# Patient Record
Sex: FEMALE | Race: WHITE | HISPANIC OR LATINO | Employment: FULL TIME | URBAN - METROPOLITAN AREA
[De-identification: names, ages, dates, MRNs, and addresses within clinical notes are randomized per-mention and may not be internally consistent; named-entity substitution may affect disease eponyms.]

---

## 2018-12-10 ENCOUNTER — APPOINTMENT (OUTPATIENT)
Dept: URGENT CARE | Age: 33
End: 2018-12-10
Payer: OTHER MISCELLANEOUS

## 2018-12-10 PROCEDURE — G0382 LEV 3 HOSP TYPE B ED VISIT: HCPCS | Performed by: PREVENTIVE MEDICINE

## 2018-12-10 PROCEDURE — 99283 EMERGENCY DEPT VISIT LOW MDM: CPT | Performed by: PREVENTIVE MEDICINE

## 2018-12-11 ENCOUNTER — TRANSCRIBE ORDERS (OUTPATIENT)
Dept: PHYSICAL THERAPY | Age: 33
End: 2018-12-11

## 2018-12-11 ENCOUNTER — EVALUATION (OUTPATIENT)
Dept: PHYSICAL THERAPY | Age: 33
End: 2018-12-11
Payer: OTHER MISCELLANEOUS

## 2018-12-11 DIAGNOSIS — M54.2 CERVICALGIA: Primary | ICD-10-CM

## 2018-12-11 DIAGNOSIS — M54.5 ACUTE LEFT-SIDED LOW BACK PAIN, WITH SCIATICA PRESENCE UNSPECIFIED: ICD-10-CM

## 2018-12-11 DIAGNOSIS — M54.2 NECK PAIN: Primary | ICD-10-CM

## 2018-12-11 PROCEDURE — G8990 OTHER PT/OT CURRENT STATUS: HCPCS | Performed by: PHYSICAL THERAPIST

## 2018-12-11 PROCEDURE — 97140 MANUAL THERAPY 1/> REGIONS: CPT | Performed by: PHYSICAL THERAPIST

## 2018-12-11 PROCEDURE — G8991 OTHER PT/OT GOAL STATUS: HCPCS | Performed by: PHYSICAL THERAPIST

## 2018-12-11 PROCEDURE — 97162 PT EVAL MOD COMPLEX 30 MIN: CPT | Performed by: PHYSICAL THERAPIST

## 2018-12-11 NOTE — PROGRESS NOTES
PT Evaluation     Today's date: 2018  Patient name: Deena Huston  : 1985  MRN: [id number]  Referring provider: Hussein Evans MD  Dx:   Encounter Diagnosis     ICD-10-CM    1  Neck pain M54 2    2  Acute left-sided low back pain, with sciatica presence unspecified M54 5                   Assessment  Assessment details: 26year old female patients reports to PT with acute left sided low back and right sided neck pain that occurred while at work on 18 when patient tried to lift a heavy box off the floor  Patient symptoms were highly irritable to generalized movement, so testing was limited  Patient was TTP in the L paraspinal and quadratus lumborum region in the low back and in the R upper trap in the cervical region, with TTP noted during mobility assessment of the lower cervical spine  Patient's primary impairments for both is mobility impairment, so focus will initially be on increasing ROM  Patient will benefit from skilled PT services to address current impairments and functional limitations to help patient return to her PLOF  Impairments: abnormal or restricted ROM, abnormal movement, activity intolerance, impaired physical strength and pain with function    Symptom irritability: highUnderstanding of Dx/Px/POC: good   Prognosis: good    Goals  STG  1  Patient will be independent with completion of HEP throughout therapy  2  Patient will have at worst 8/10 pain so patient can sleep with less discomfort in 3 weeks  LTG  1  Patient will increase cervical ROM to WNL so patient can drive without difficulty in 6 weeks  2  Patient will increase lumbar ROM to WNL so patient can lift objects off the floor with proper body mechanics in 6 weeks  3  Patient will return to work without increased difficulty in 6 weeks      Plan  Patient would benefit from: skilled physical therapy  Planned therapy interventions: abdominal trunk stabilization, joint mobilization, manual therapy, neuromuscular re-education, patient education, strengthening, stretching, therapeutic activities, therapeutic exercise, home exercise program, functional ROM exercises, flexibility and body mechanics training  Frequency: 2x week  Duration in weeks: 6  Treatment plan discussed with: patient        Subjective Evaluation    History of Present Illness  Mechanism of injury: Patient reports with acute left low back and right neck pain that happened at work 18 when patient went to  a really heavy box  Patient denies numbness/tingling, progressive weakness, bowel/bladder issues, saddle anesthesia, and any dexterity or balance deficits  Patient symptoms are highly irritable to general movement, and patient has difficulty sleeping due to her pain  Patient is currently not working  Pain  Current pain ratin  At best pain ratin  At worst pain rating: 10  Aggravating factors: walking, standing, sitting, overhead activity and lifting    Treatments  Current treatment: physical therapy  Patient Goals  Patient goals for therapy: decreased pain, increased motion and return to work          Objective     Special Questions  Positive for disturbed sleep   Negative for night pain, bladder dysfunction, bowel dysfunction and saddle (S4) numbness    Palpation     Additional Palpation Details  R upper trap TTP  TTP to L paraspinals and QL region, with QL being > than paraspinals    Tenderness     Additional Tenderness Details  TTP throughout cervical mobility assessment, with lower cervical being more irritable    Neurological Testing     Sensation     Lumbar   Left   Intact: light touch    Right   Intact: light touch    Active Range of Motion   Cervical/Thoracic Spine   Cervical    Left rotation: 45 degrees   Right rotation: 60 degrees     Lumbar   Flexion: 30 degrees with pain  Extension: 5 degrees with pain    Strength/Myotome Testing     Left Ankle/Foot   Dorsiflexion: 4  Great toe extension: 4    Right Ankle/Foot   Dorsiflexion: 4  Great toe extension: 4      Flowsheet Rows      Most Recent Value   PT/OT G-Codes   Current Score  35   Projected Score  59   FOTO information reviewed  Yes   Assessment Type  Evaluation   G code set  Other PT/OT Primary   Other PT Primary Current Status ()  CL   Other PT Primary Goal Status ()  CK          Precautions: none    Daily Treatment Diary     Manual  12/11            R cervical manuals STM, Gr I lower cervical mobs                                                                    Exercise Diary  12/11            [address] If can tolerate            Seated lumbar flexion If can tolerate                                                                                                                                                                                                                                             Modalities  12/11            MH 10 min cervical/lowback seated

## 2018-12-11 NOTE — LETTER
2018    Nadia Romeo MD  [id number]    Patient: Adi Michelle   YOB: 1985   Date of Visit: 2018     Encounter Diagnosis     ICD-10-CM    1  Neck pain M54 2    2  Acute left-sided low back pain, with sciatica presence unspecified M54 5        Dear Dr Nilda Sanchez:    Please review the attached Plan of Care from [facility]'s recent visit  Please verify that you agree therapy should continue by signing the attached document and sending it back to our office  If you have any questions or concerns, please don't hesitate to call  Sincerely,    Krishna Mcarthur, PT      Referring Provider:      I certify that I have read the below Plan of Care and certify the need for these services furnished under this plan of treatment while under my care  Nadia Romeo MD  [address] [address]  VIA Facsimile: [phone]          PT Evaluation     Today's date: 2018  Patient name: Adi Michelle  : 1985  MRN: [id number]  Referring provider: Breezy Hamilton MD  Dx:   Encounter Diagnosis     ICD-10-CM    1  Neck pain M54 2    2  Acute left-sided low back pain, with sciatica presence unspecified M54 5                   Assessment  Assessment details: 26year old female patients reports to PT with acute left sided low back and right sided neck pain that occurred while at work on 18 when patient tried to lift a heavy box off the floor  Patient symptoms were highly irritable to generalized movement, so testing was limited  Patient was TTP in the L paraspinal and quadratus lumborum region in the low back and in the R upper trap in the cervical region, with TTP noted during mobility assessment of the lower cervical spine  Patient's primary impairments for both is mobility impairment, so focus will initially be on increasing ROM    Patient will benefit from skilled PT services to address current impairments and functional limitations to help patient return to her PLOF  Impairments: abnormal or restricted ROM, abnormal movement, activity intolerance, impaired physical strength and pain with function    Symptom irritability: highUnderstanding of Dx/Px/POC: good   Prognosis: good    Goals  STG  1  Patient will be independent with completion of HEP throughout therapy  2  Patient will have at worst 8/10 pain so patient can sleep with less discomfort in 3 weeks  LTG  1  Patient will increase cervical ROM to WNL so patient can drive without difficulty in 6 weeks  2  Patient will increase lumbar ROM to WNL so patient can lift objects off the floor with proper body mechanics in 6 weeks  3  Patient will return to work without increased difficulty in 6 weeks  Plan  Patient would benefit from: skilled physical therapy  Planned therapy interventions: abdominal trunk stabilization, joint mobilization, manual therapy, neuromuscular re-education, patient education, strengthening, stretching, therapeutic activities, therapeutic exercise, home exercise program, functional ROM exercises, flexibility and body mechanics training  Frequency: 2x week  Duration in weeks: 6  Treatment plan discussed with: patient        Subjective Evaluation    History of Present Illness  Mechanism of injury: Patient reports with acute left low back and right neck pain that happened at work 18 when patient went to  a really heavy box  Patient denies numbness/tingling, progressive weakness, bowel/bladder issues, saddle anesthesia, and any dexterity or balance deficits  Patient symptoms are highly irritable to general movement, and patient has difficulty sleeping due to her pain  Patient is currently not working      Pain  Current pain ratin  At best pain ratin  At worst pain rating: 10  Aggravating factors: walking, standing, sitting, overhead activity and lifting    Treatments  Current treatment: physical therapy  Patient Goals  Patient goals for therapy: decreased pain, increased motion and return to work          Objective     Special Questions  Positive for disturbed sleep   Negative for night pain, bladder dysfunction, bowel dysfunction and saddle (S4) numbness    Palpation     Additional Palpation Details  R upper trap TTP  TTP to L paraspinals and QL region, with QL being > than paraspinals    Tenderness     Additional Tenderness Details  TTP throughout cervical mobility assessment, with lower cervical being more irritable    Neurological Testing     Sensation     Lumbar   Left   Intact: light touch    Right   Intact: light touch    Active Range of Motion   Cervical/Thoracic Spine   Cervical    Left rotation: 45 degrees   Right rotation: 60 degrees     Lumbar   Flexion: 30 degrees with pain  Extension: 5 degrees with pain    Strength/Myotome Testing     Left Ankle/Foot   Dorsiflexion: 4  Great toe extension: 4    Right Ankle/Foot   Dorsiflexion: 4  Great toe extension: 4      Flowsheet Rows      Most Recent Value   PT/OT G-Codes   Current Score  35   Projected Score  59   FOTO information reviewed  Yes   Assessment Type  Evaluation   G code set  Other PT/OT Primary   Other PT Primary Current Status ()  CL   Other PT Primary Goal Status ()  CK          Precautions: none    Daily Treatment Diary     Manual  12/11            R cervical manuals STM, Gr I lower cervical mobs                                                                    Exercise Diary  12/11            [facility]             DKTC If can tolerate            Seated lumbar flexion If can tolerate                                                                                                                                                                                                                                             Modalities  12/11             10 min cervical/lowback seated

## 2018-12-14 ENCOUNTER — OFFICE VISIT (OUTPATIENT)
Dept: PHYSICAL THERAPY | Age: 33
End: 2018-12-14
Payer: OTHER MISCELLANEOUS

## 2018-12-14 DIAGNOSIS — M25.552 LEFT HIP PAIN: ICD-10-CM

## 2018-12-14 DIAGNOSIS — M54.5 ACUTE LEFT-SIDED LOW BACK PAIN, WITH SCIATICA PRESENCE UNSPECIFIED: ICD-10-CM

## 2018-12-14 DIAGNOSIS — M54.2 NECK PAIN: Primary | ICD-10-CM

## 2018-12-14 PROCEDURE — 97110 THERAPEUTIC EXERCISES: CPT | Performed by: PHYSICAL THERAPIST

## 2018-12-14 NOTE — PROGRESS NOTES
Daily Note     Today's date: 2018  Patient name: Maci Lemus  : 1985  MRN: [id number]  Referring provider: Vickey Max MD  Dx:   Encounter Diagnosis     ICD-10-CM    1  Neck pain M54 2    2  Acute left-sided low back pain, with sciatica presence unspecified M54 5                   Subjective: Patient notes neck is still stiff very stiff, and she is having more pain and numbness down her leg, especially when she puts weight on it and she sits  Objective: See treatment diary below    Precautions: none     Daily Treatment Diary      Manual                     R cervical manuals STM, Gr I lower cervical mobs                                                                                                                           Exercise Diary                     SKTC                       DKTC If can tolerate                     Seated lumbar flexion If can tolerate                     R sidelying with pillows under hips   3 pillows 5 min, 4 pillows 15 minutes                                                                                                                                                                                                                                                                                                                                                                                                                         Modalities                     MH 10 min cervical/lowback seated  10 min low back seated                                                                         Assessment: Tolerated treatment fair  Patient presented with R lateral shift today, as patient was unable to put weight through her L LE, and when she was sitting, she had pain down her L LE and numbness    Lateral shift correction was initiated in sidelying position as to not be too aggressive as patient's symptoms are still highly irritable  Patient was able to tolerate lying on her R side with four pillows for prolonged periods, and post, had no L LE symptoms when in seated position, and had decrease in pain rating by 2 points  Patient cervical region is still highly irritable, so patient educated to continue to try and move it as much as she can  Retractions were trialed this treatment, but patient was unable to tolerate  Plan: Progress treatment as tolerated  If patient still remains highly irritable with minimal gains from PT, consider referral to pain management for further testing

## 2018-12-17 ENCOUNTER — APPOINTMENT (OUTPATIENT)
Dept: URGENT CARE | Age: 33
End: 2018-12-17
Payer: OTHER MISCELLANEOUS

## 2018-12-17 ENCOUNTER — OFFICE VISIT (OUTPATIENT)
Dept: PHYSICAL THERAPY | Age: 33
End: 2018-12-17
Payer: OTHER MISCELLANEOUS

## 2018-12-17 DIAGNOSIS — M54.5 ACUTE LEFT-SIDED LOW BACK PAIN, WITH SCIATICA PRESENCE UNSPECIFIED: ICD-10-CM

## 2018-12-17 DIAGNOSIS — M54.2 NECK PAIN: Primary | ICD-10-CM

## 2018-12-17 PROCEDURE — 97110 THERAPEUTIC EXERCISES: CPT | Performed by: PHYSICAL THERAPIST

## 2018-12-17 PROCEDURE — 99214 OFFICE O/P EST MOD 30 MIN: CPT | Performed by: PREVENTIVE MEDICINE

## 2018-12-17 NOTE — PROGRESS NOTES
Daily Note     Today's date: 2018  Patient name: Jose Ramon Smith  : 1985  MRN: [id number]  Referring provider: Evonne Barragan MD  Dx:   Encounter Diagnosis     ICD-10-CM    1  Neck pain M54 2    2  Acute left-sided low back pain, with sciatica presence unspecified M54 5                   Subjective: Patient notes was in 7/10 pain earlier today, but now symptoms are increased from her MD examining her  Objective: See treatment diary below    Precautions: none     Daily Treatment Diary      Manual                   R cervical manuals STM, Gr I lower cervical mobs                                                                                                                           Exercise Diary                   SKTC                       DKTC If can tolerate                     Seated lumbar flexion If can tolerate                     R sidelying with pillows under hips   3 pillows 5 min, 4 pillows 15 minutes  3 pillows 10 min, 4 pillows 5 min                                                                                                                                                                                                                                                                                                                                                                                                                       Modalities                   MH 10 min cervical/lowback seated  10 min low back seated  10 min low back seated                                                                       Assessment: Tolerated treatment fair  Patient hasn't had L LE symptoms since began correcting lateral shift  Patient was able to tolerate 2 pillows lying on her side for a few minutes until symptoms in low back flared up    Continue to trial prolonged positions to help address lateral shift and posterior derangement, as patient did not some decrease in pain this morning  Plan: Progress treatment as tolerated  If patient still remains highly irritable with minimal gains from PT, consider referral to pain management for further testing

## 2018-12-20 ENCOUNTER — APPOINTMENT (OUTPATIENT)
Dept: PHYSICAL THERAPY | Age: 33
End: 2018-12-20
Payer: OTHER MISCELLANEOUS

## 2018-12-24 ENCOUNTER — OFFICE VISIT (OUTPATIENT)
Dept: PHYSICAL THERAPY | Age: 33
End: 2018-12-24
Payer: OTHER MISCELLANEOUS

## 2018-12-24 ENCOUNTER — TELEPHONE (OUTPATIENT)
Dept: PAIN MEDICINE | Facility: MEDICAL CENTER | Age: 33
End: 2018-12-24

## 2018-12-24 DIAGNOSIS — M54.5 ACUTE LEFT-SIDED LOW BACK PAIN, WITH SCIATICA PRESENCE UNSPECIFIED: Primary | ICD-10-CM

## 2018-12-24 PROCEDURE — 97140 MANUAL THERAPY 1/> REGIONS: CPT

## 2018-12-24 PROCEDURE — 97010 HOT OR COLD PACKS THERAPY: CPT

## 2018-12-24 NOTE — TELEPHONE ENCOUNTER
Patient called to schedule for spine and pain she have been seen physical therapist Jeremias Ac patient stated he reffered her to spine and pain specialist because her symptoms have worsen  Patient said she is taking 750 ml of methocarbomol and that is not helping her pain  No previous pain management  Intake was done please review thanks                                      Call back# [phone]

## 2018-12-24 NOTE — PROGRESS NOTES
Daily Note     Today's date: 2018  Patient name: Adi Michelle  : 1985  MRN: [id number]  Referring provider: Breezy Hamilton MD  Dx:   Encounter Diagnosis     ICD-10-CM    1  Acute left-sided low back pain, with sciatica presence unspecified M54 5                   Subjective: Patient was to ER on  for LB after work shift and has been given meds to help pain  Still with antalgic gait noted      Objective: See treatment diary below    Precautions: none     Daily Treatment Diary      Manual                 R cervical manuals STM, Gr I lower cervical mobs      STM to B LB in s/l x 8min                                                                                                                     Exercise Diary                 SKTC                       DKTC If can tolerate                     Seated lumbar flexion If can tolerate                     R sidelying with pillows under hips   3 pillows 5 min, 4 pillows 15 minutes  3 pillows 10 min, 4 pillows 5 min  3 pillows x10 min                                                                                                                                                                                                                                                                                                                                                                                                                     Modalities                 MH 10 min cervical/lowback seated  10 min low back seated  10 min low back seated  10 min to LB in seated                                                                     Assessment: Tolerated treatment fair  Minimal change even with soft tissue moblization addition to LB Pt reassessed by LPT with continued lack of improvement  LPT felt it was best to refer pt to pain management       Plan: D/C PT to Home Program

## 2018-12-27 ENCOUNTER — OFFICE VISIT (OUTPATIENT)
Dept: PHYSICAL THERAPY | Age: 33
End: 2018-12-27
Payer: OTHER MISCELLANEOUS

## 2018-12-27 DIAGNOSIS — M54.5 ACUTE LEFT-SIDED LOW BACK PAIN, WITH SCIATICA PRESENCE UNSPECIFIED: Primary | ICD-10-CM

## 2018-12-27 PROCEDURE — 97110 THERAPEUTIC EXERCISES: CPT

## 2018-12-27 PROCEDURE — 97140 MANUAL THERAPY 1/> REGIONS: CPT

## 2018-12-27 PROCEDURE — 97010 HOT OR COLD PACKS THERAPY: CPT

## 2018-12-27 NOTE — PROGRESS NOTES
Daily Note     Today's date: 2018  Patient name: Yousif Wilkes  : 1985  MRN: [id number]  Referring provider: Mela Fontenot MD  Dx:   Encounter Diagnosis     ICD-10-CM    1  Acute left-sided low back pain, with sciatica presence unspecified M54 5                   Subjective: Patient was to ER on  for LB after work shift and has been given meds to help pain  Still with antalgic gait noted   Comes to PT after work shift with continued LBP on L vs R and no radicular pain noted      Objective: See treatment diary below    Precautions: none     Daily Treatment Diary      Manual               R cervical manuals STM, Gr I lower cervical mobs      STM to B LB in s/l x 8min  STM to B LB in s/l x 8min                                                                                                                   Exercise Diary               SKTC                       DKTC If can tolerate                     Seated lumbar flexion If can tolerate                     R sidelying with pillows under hips   3 pillows 5 min, 4 pillows 15 minutes  3 pillows 10 min, 4 pillows 5 min  3 pillows x10 min  3pillows x10              prone on pillows          3min 3pillows, 3 min 2 pillows                                                                                                                                                                                                                                                                                                                                                                                           Modalities             MH 10 min cervical/lowback seated  10 min low back seated  10 min low back seated  10 min to LB in seated  10min to LB seated  10min to LB seated                                                                 Assessment: Tolerated treatment fair  Minimal change even with soft tissue moblization   Did feel that positional changes did help with " pressure" in LB as she was in extreme pain  Prior to tx in LB  Plan: Possible D/C PT to Home Program

## 2018-12-31 ENCOUNTER — APPOINTMENT (OUTPATIENT)
Dept: URGENT CARE | Age: 33
End: 2018-12-31
Payer: OTHER MISCELLANEOUS

## 2018-12-31 PROCEDURE — 99214 OFFICE O/P EST MOD 30 MIN: CPT | Performed by: PREVENTIVE MEDICINE

## 2019-01-07 ENCOUNTER — APPOINTMENT (OUTPATIENT)
Dept: PHYSICAL THERAPY | Age: 34
End: 2019-01-07
Payer: OTHER MISCELLANEOUS

## 2019-01-08 ENCOUNTER — APPOINTMENT (OUTPATIENT)
Dept: URGENT CARE | Age: 34
End: 2019-01-08
Payer: OTHER MISCELLANEOUS

## 2019-01-08 PROCEDURE — 99213 OFFICE O/P EST LOW 20 MIN: CPT | Performed by: PREVENTIVE MEDICINE

## 2019-01-09 ENCOUNTER — OFFICE VISIT (OUTPATIENT)
Dept: PHYSICAL THERAPY | Age: 34
End: 2019-01-09
Payer: OTHER MISCELLANEOUS

## 2019-01-09 DIAGNOSIS — M54.2 NECK PAIN: ICD-10-CM

## 2019-01-09 DIAGNOSIS — M54.5 ACUTE LEFT-SIDED LOW BACK PAIN, WITH SCIATICA PRESENCE UNSPECIFIED: Primary | ICD-10-CM

## 2019-01-09 PROCEDURE — 97140 MANUAL THERAPY 1/> REGIONS: CPT | Performed by: PHYSICAL THERAPIST

## 2019-01-09 PROCEDURE — 97110 THERAPEUTIC EXERCISES: CPT | Performed by: PHYSICAL THERAPIST

## 2019-01-09 NOTE — PROGRESS NOTES
Daily Note     Today's date: 2019  Patient name: Eliezer Baker  : 1985  MRN: [id number]  Referring provider: Efren Valdovinos MD  Dx:   Encounter Diagnosis     ICD-10-CM    1  Acute left-sided low back pain, with sciatica presence unspecified M54 5    2  Neck pain M54 2                   Subjective: Patient notes not much relief since onset of symptoms, and is continuing to work 8-9 hour days light duty which requires lots of sitting    Patient still hasn't heard back from pain management MD       Objective: See treatment diary below    Precautions: none     Daily Treatment Diary      Manual           R cervical manuals STM, Gr I lower cervical mobs      STM to B LB in s/l x 8min  STM to B LB in s/l x 8min    STM to L low back region 5 min with Gr I/II mobs L lower lumbar  8 min IASTM to R cervical region seated                                                                                                               Exercise Diary             SKTC                       DKTC If can tolerate                     Seated lumbar flexion If can tolerate                     R sidelying with pillows under hips   3 pillows 5 min, 4 pillows 15 minutes  3 pillows 10 min, 4 pillows 5 min  3 pillows x10 min  3pillows x10              prone on pillows          3min 3pillows, 3 min 2 pillows  post side glides 10 min 2 pillows             L side glides           20x                                                                                                                                                                                                                                                                                                                                                                 Modalities           MH 10 min cervical/lowback seated  10 min low back seated  10 min low back seated  10 min to LB in seated  10min to LB seated  10min to LB seated  10 min to low almita k seated                                                               Assessment: Tolerated treatment fair  Patient tolerated some gentle manuals to R cervical region this treatment with IASTM while in seated position  Patient tolerated only 2 pillows on her stomach and post L side glides followed by prone lying, patient noted decrease in symptoms  Pain management will be contacted as patient still hasn't seen them yet  Plan: Progress as tolerated

## 2019-01-14 ENCOUNTER — APPOINTMENT (OUTPATIENT)
Dept: PHYSICAL THERAPY | Age: 34
End: 2019-01-14
Payer: OTHER MISCELLANEOUS

## 2019-01-21 ENCOUNTER — APPOINTMENT (OUTPATIENT)
Dept: URGENT CARE | Age: 34
End: 2019-01-21
Payer: OTHER MISCELLANEOUS

## 2019-01-21 PROCEDURE — 99213 OFFICE O/P EST LOW 20 MIN: CPT | Performed by: PREVENTIVE MEDICINE
